# Patient Record
Sex: MALE | Race: WHITE | NOT HISPANIC OR LATINO | Employment: FULL TIME | URBAN - METROPOLITAN AREA
[De-identification: names, ages, dates, MRNs, and addresses within clinical notes are randomized per-mention and may not be internally consistent; named-entity substitution may affect disease eponyms.]

---

## 2022-05-12 PROCEDURE — 87591 N.GONORRHOEAE DNA AMP PROB: CPT | Performed by: EMERGENCY MEDICINE

## 2022-05-12 PROCEDURE — 87491 CHLMYD TRACH DNA AMP PROBE: CPT | Performed by: EMERGENCY MEDICINE

## 2022-09-20 ENCOUNTER — OFFICE VISIT (OUTPATIENT)
Dept: FAMILY MEDICINE CLINIC | Facility: CLINIC | Age: 52
End: 2022-09-20

## 2022-09-20 VITALS
OXYGEN SATURATION: 98 % | BODY MASS INDEX: 40.96 KG/M2 | WEIGHT: 302.4 LBS | HEIGHT: 72 IN | HEART RATE: 68 BPM | SYSTOLIC BLOOD PRESSURE: 144 MMHG | DIASTOLIC BLOOD PRESSURE: 86 MMHG

## 2022-09-20 DIAGNOSIS — R53.83 FATIGUE, UNSPECIFIED TYPE: ICD-10-CM

## 2022-09-20 DIAGNOSIS — Z12.11 COLON CANCER SCREENING: ICD-10-CM

## 2022-09-20 DIAGNOSIS — E04.9 THYROID ENLARGED: ICD-10-CM

## 2022-09-20 DIAGNOSIS — E34.9 TESTOSTERONE DEFICIENCY: ICD-10-CM

## 2022-09-20 DIAGNOSIS — Z01.89 ROUTINE LAB DRAW: Primary | ICD-10-CM

## 2022-09-20 DIAGNOSIS — Z13.29 SCREENING FOR THYROID DISORDER: ICD-10-CM

## 2022-09-20 DIAGNOSIS — Z90.3 H/O GASTRIC SLEEVE: ICD-10-CM

## 2022-09-20 DIAGNOSIS — I10 HYPERTENSION, UNSPECIFIED TYPE: ICD-10-CM

## 2022-09-20 DIAGNOSIS — Z86.39 HISTORY OF THYROID DISORDER: ICD-10-CM

## 2022-09-20 DIAGNOSIS — E55.9 VITAMIN D DEFICIENCY: ICD-10-CM

## 2022-09-20 DIAGNOSIS — E53.8 B12 DEFICIENCY: ICD-10-CM

## 2022-09-20 DIAGNOSIS — Z13.220 SCREENING FOR LIPID DISORDERS: ICD-10-CM

## 2022-09-20 DIAGNOSIS — Z12.5 PROSTATE CANCER SCREENING: ICD-10-CM

## 2022-09-20 PROCEDURE — 99214 OFFICE O/P EST MOD 30 MIN: CPT | Performed by: NURSE PRACTITIONER

## 2022-09-20 NOTE — PROGRESS NOTES
"Chief Complaint  Obesity, fatigue, establish care  SUBJECTIVE  Raúl López presents to [facility]     Pt presents today to establish care. Had gastric sleeve 10 years ago, states would like to get vitamin levels checked, has hx of Vit d and b-12 def and testosterone def. History of hypothyroid as well     States over the last few years has started gaining some weight back .     History of Present Illness  History reviewed. No pertinent past medical history.   History reviewed. No pertinent family history.   Past Surgical History:   Procedure Laterality Date   • ADENOIDECTOMY     • EAR TUBES     • GASTRIC SLEEVE LAPAROSCOPIC     • HERNIA REPAIR     • TONSILLECTOMY          Current Outpatient Medications:   •  fluticasone (FLONASE) 50 MCG/ACT nasal spray, 2 sprays into the nostril(s) as directed by provider Daily., Disp: 9.9 mL, Rfl: 0    OBJECTIVE  Vital Signs:   /86 (BP Location: Left arm)   Pulse 68   Ht 182.9 cm (72\")   Wt (!) 137 kg (302 lb 6.4 oz)   SpO2 98%   BMI 41.01 kg/m²    Estimated body mass index is 41.01 kg/m² as calculated from the following:    Height as of this encounter: 182.9 cm (72\").    Weight as of this encounter: 137 kg (302 lb 6.4 oz).     Wt Readings from Last 3 Encounters:   09/20/22 (!) 137 kg (302 lb 6.4 oz)   08/10/22 (!) 137 kg (302 lb 14.4 oz)   05/12/22 (!) 142 kg (313 lb 12.8 oz)     BP Readings from Last 3 Encounters:   09/20/22 144/86   08/25/22 178/92   08/10/22 137/85       Physical Exam  Vitals reviewed.   Constitutional:       Appearance: Normal appearance. He is well-developed.   HENT:      Head: Normocephalic and atraumatic.      Right Ear: External ear normal.      Left Ear: External ear normal.   Eyes:      Conjunctiva/sclera: Conjunctivae normal.      Pupils: Pupils are equal, round, and reactive to light.   Neck:      Comments: Thyroid feels mildly enlarged on the right  Cardiovascular:      Rate and Rhythm: Normal rate and " regular rhythm.      Heart sounds: No murmur heard.    No friction rub. No gallop.   Pulmonary:      Effort: Pulmonary effort is normal.      Breath sounds: Normal breath sounds. No wheezing or rhonchi.   Skin:     General: Skin is warm and dry.   Neurological:      Mental Status: He is alert and oriented to person, place, and time.      Cranial Nerves: No cranial nerve deficit.   Psychiatric:         Mood and Affect: Mood and affect normal.         Behavior: Behavior normal.         Thought Content: Thought content normal.         Judgment: Judgment normal.          Result Review          No Images in the past 120 days found..     The above data has been reviewed by RAHUL Palencia 09/20/2022 14:25 EDT.          Patient Care Team:  Katie Pang APRN as PCP - General (Nurse Practitioner)    Class 3 Severe Obesity (BMI >=40). Obesity-related health conditions include the following: hypertension. Obesity is worsening. BMI is is above average; BMI management plan is completed. We discussed portion control and increasing exercise.       ASSESSMENT & PLAN    Diagnoses and all orders for this visit:    1. Routine lab draw (Primary)  -     Comprehensive Metabolic Panel; Future  -     CBC & Differential; Future    2. Screening for thyroid disorder  -     TSH; Future    3. Screening for lipid disorders  -     Lipid Panel; Future    4. Fatigue, unspecified type  -     T4, free; Future  -     Testosterone; Future    5. H/O gastric sleeve  -     Vitamin B12; Future  -     Vitamin D 25 hydroxy; Future    6. Colon cancer screening  -     Ambulatory Referral For Screening Colonoscopy; Future    7. Prostate cancer screening  -     PSA SCREENING; Future    8. Thyroid enlarged  -     US Thyroid    9. History of thyroid disorder  -     US Thyroid    10. Hypertension, unspecified type  Assessment & Plan:  Patient reports he has been told several times that his blood pressure is elevated, we are going to go ahead and  initiate low-dose lisinopril today, side effects administration medication discussed, patient will follow-up in 1 month for reevaluation      11. B12 deficiency  -     Vitamin B12; Future    12. Vitamin D deficiency  -     Vitamin D 25 hydroxy; Future    13. Testosterone deficiency  -     Testosterone; Future       Tobacco Use: Low Risk    • Smoking Tobacco Use: Never Smoker   • Smokeless Tobacco Use: Never Used       Follow Up     Return if symptoms worsen or fail to improve.        Patient was given instructions and counseling regarding his condition or for health maintenance advice. Please see specific information pulled into the AVS if appropriate.   I have reviewed information obtained and documented by others and I have confirmed the accuracy of this documented note.    Katie Pang, APRN

## 2022-09-20 NOTE — ASSESSMENT & PLAN NOTE
Patient reports he has been told several times that his blood pressure is elevated, we are going to go ahead and initiate low-dose lisinopril today, side effects administration medication discussed, patient will follow-up in 1 month for reevaluation

## 2022-09-21 ENCOUNTER — LAB (OUTPATIENT)
Dept: LAB | Facility: HOSPITAL | Age: 52
End: 2022-09-21

## 2022-09-21 DIAGNOSIS — E53.8 B12 DEFICIENCY: ICD-10-CM

## 2022-09-21 DIAGNOSIS — Z01.89 ROUTINE LAB DRAW: ICD-10-CM

## 2022-09-21 DIAGNOSIS — Z13.220 SCREENING FOR LIPID DISORDERS: ICD-10-CM

## 2022-09-21 DIAGNOSIS — Z12.5 PROSTATE CANCER SCREENING: ICD-10-CM

## 2022-09-21 DIAGNOSIS — E55.9 VITAMIN D DEFICIENCY: ICD-10-CM

## 2022-09-21 DIAGNOSIS — Z13.29 SCREENING FOR THYROID DISORDER: ICD-10-CM

## 2022-09-21 DIAGNOSIS — R53.83 FATIGUE, UNSPECIFIED TYPE: ICD-10-CM

## 2022-09-21 DIAGNOSIS — E34.9 TESTOSTERONE DEFICIENCY: ICD-10-CM

## 2022-09-21 DIAGNOSIS — Z90.3 H/O GASTRIC SLEEVE: ICD-10-CM

## 2022-09-21 LAB
25(OH)D3 SERPL-MCNC: 26 NG/ML (ref 30–100)
ALBUMIN SERPL-MCNC: 4.3 G/DL (ref 3.5–5.2)
ALBUMIN/GLOB SERPL: 1.7 G/DL
ALP SERPL-CCNC: 64 U/L (ref 39–117)
ALT SERPL W P-5'-P-CCNC: 10 U/L (ref 1–41)
ANION GAP SERPL CALCULATED.3IONS-SCNC: 9 MMOL/L (ref 5–15)
AST SERPL-CCNC: 17 U/L (ref 1–40)
BASOPHILS # BLD AUTO: 0.05 10*3/MM3 (ref 0–0.2)
BASOPHILS NFR BLD AUTO: 0.8 % (ref 0–1.5)
BILIRUB SERPL-MCNC: 0.8 MG/DL (ref 0–1.2)
BUN SERPL-MCNC: 15 MG/DL (ref 6–20)
BUN/CREAT SERPL: 14.6 (ref 7–25)
CALCIUM SPEC-SCNC: 9.2 MG/DL (ref 8.6–10.5)
CHLORIDE SERPL-SCNC: 104 MMOL/L (ref 98–107)
CHOLEST SERPL-MCNC: 158 MG/DL (ref 0–200)
CO2 SERPL-SCNC: 26 MMOL/L (ref 22–29)
CREAT SERPL-MCNC: 1.03 MG/DL (ref 0.76–1.27)
DEPRECATED RDW RBC AUTO: 40.6 FL (ref 37–54)
EGFRCR SERPLBLD CKD-EPI 2021: 88.5 ML/MIN/1.73
EOSINOPHIL # BLD AUTO: 0.08 10*3/MM3 (ref 0–0.4)
EOSINOPHIL NFR BLD AUTO: 1.2 % (ref 0.3–6.2)
ERYTHROCYTE [DISTWIDTH] IN BLOOD BY AUTOMATED COUNT: 13.1 % (ref 12.3–15.4)
GLOBULIN UR ELPH-MCNC: 2.6 GM/DL
GLUCOSE SERPL-MCNC: 89 MG/DL (ref 65–99)
HCT VFR BLD AUTO: 43.8 % (ref 37.5–51)
HDLC SERPL-MCNC: 37 MG/DL (ref 40–60)
HGB BLD-MCNC: 14.6 G/DL (ref 13–17.7)
IMM GRANULOCYTES # BLD AUTO: 0.04 10*3/MM3 (ref 0–0.05)
IMM GRANULOCYTES NFR BLD AUTO: 0.6 % (ref 0–0.5)
LDLC SERPL CALC-MCNC: 107 MG/DL (ref 0–100)
LDLC/HDLC SERPL: 2.9 {RATIO}
LYMPHOCYTES # BLD AUTO: 2.58 10*3/MM3 (ref 0.7–3.1)
LYMPHOCYTES NFR BLD AUTO: 39.5 % (ref 19.6–45.3)
MCH RBC QN AUTO: 28.9 PG (ref 26.6–33)
MCHC RBC AUTO-ENTMCNC: 33.3 G/DL (ref 31.5–35.7)
MCV RBC AUTO: 86.6 FL (ref 79–97)
MONOCYTES # BLD AUTO: 0.49 10*3/MM3 (ref 0.1–0.9)
MONOCYTES NFR BLD AUTO: 7.5 % (ref 5–12)
NEUTROPHILS NFR BLD AUTO: 3.29 10*3/MM3 (ref 1.7–7)
NEUTROPHILS NFR BLD AUTO: 50.4 % (ref 42.7–76)
NRBC BLD AUTO-RTO: 0 /100 WBC (ref 0–0.2)
PLATELET # BLD AUTO: 230 10*3/MM3 (ref 140–450)
PMV BLD AUTO: 10.7 FL (ref 6–12)
POTASSIUM SERPL-SCNC: 4.1 MMOL/L (ref 3.5–5.2)
PROT SERPL-MCNC: 6.9 G/DL (ref 6–8.5)
PSA SERPL-MCNC: 0.96 NG/ML (ref 0–4)
RBC # BLD AUTO: 5.06 10*6/MM3 (ref 4.14–5.8)
SODIUM SERPL-SCNC: 139 MMOL/L (ref 136–145)
T4 FREE SERPL-MCNC: 0.88 NG/DL (ref 0.93–1.7)
TESTOST SERPL-MCNC: 340 NG/DL (ref 193–740)
TRIGL SERPL-MCNC: 69 MG/DL (ref 0–150)
TSH SERPL DL<=0.05 MIU/L-ACNC: 4.03 UIU/ML (ref 0.27–4.2)
VIT B12 BLD-MCNC: 213 PG/ML (ref 211–946)
VLDLC SERPL-MCNC: 14 MG/DL (ref 5–40)
WBC NRBC COR # BLD: 6.53 10*3/MM3 (ref 3.4–10.8)

## 2022-09-21 PROCEDURE — 84439 ASSAY OF FREE THYROXINE: CPT

## 2022-09-21 PROCEDURE — 82607 VITAMIN B-12: CPT

## 2022-09-21 PROCEDURE — 80061 LIPID PANEL: CPT

## 2022-09-21 PROCEDURE — 82306 VITAMIN D 25 HYDROXY: CPT

## 2022-09-21 PROCEDURE — 80050 GENERAL HEALTH PANEL: CPT

## 2022-09-21 PROCEDURE — 36415 COLL VENOUS BLD VENIPUNCTURE: CPT

## 2022-09-21 PROCEDURE — 84403 ASSAY OF TOTAL TESTOSTERONE: CPT

## 2022-09-21 PROCEDURE — G0103 PSA SCREENING: HCPCS

## 2022-09-23 ENCOUNTER — TELEPHONE (OUTPATIENT)
Dept: FAMILY MEDICINE CLINIC | Facility: CLINIC | Age: 52
End: 2022-09-23

## 2022-09-23 ENCOUNTER — PATIENT ROUNDING (BHMG ONLY) (OUTPATIENT)
Dept: FAMILY MEDICINE CLINIC | Facility: CLINIC | Age: 52
End: 2022-09-23

## 2022-09-23 NOTE — PROGRESS NOTES
A Yolto message has been sent to the patient for PATIENT ROUNDING with [facility].

## 2022-09-23 NOTE — TELEPHONE ENCOUNTER
Caller: Raúl López    Relationship: Self    Best call back number: [phone]    What medication are you requesting:LOW DOSE LISINOPRIL    What are your current symptoms: N/A    How long have you been experiencing symptoms: N/A    Have you had these symptoms before:    [x] Yes  [] No    Have you been treated for these symptoms before:   [x] Yes  [] No    If a prescription is needed, what is your preferred pharmacy and phone number: [facility] - [address] - [phone]  - [phone] FX     Additional notes:PATIENT WAS SEEN BY [facility] ON 09/20/2022 AND WAS DETERMINED TO PLACE HIM ON LOW DOSE LISINOPRIL FOR HIS HIGH BLOOD PRESSURE FROM HIS APPOINTMENT NOTE. HE CHECKED WITH THE PHARMACY AND THIS HAD BEEN CALLED IN. PLEASE SUBMIT TO PHARMACY TODAY ASAP.

## 2022-09-26 DIAGNOSIS — E55.9 VITAMIN D DEFICIENCY: Primary | ICD-10-CM

## 2022-09-26 DIAGNOSIS — E04.9 THYROID ENLARGED: ICD-10-CM

## 2022-09-26 DIAGNOSIS — E53.8 B12 DEFICIENCY: ICD-10-CM

## 2022-09-26 RX ORDER — CYANOCOBALAMIN 1000 UG/ML
INJECTION, SOLUTION INTRAMUSCULAR; SUBCUTANEOUS
Qty: 1 ML | Refills: 12 | Status: SHIPPED | OUTPATIENT
Start: 2022-09-26

## 2022-09-26 RX ORDER — LISINOPRIL 10 MG/1
10 TABLET ORAL DAILY
Qty: 30 TABLET | Refills: 1 | Status: SHIPPED | OUTPATIENT
Start: 2022-09-26 | End: 2022-10-20 | Stop reason: SDUPTHER

## 2022-09-26 RX ORDER — ERGOCALCIFEROL 1.25 MG/1
50000 CAPSULE ORAL WEEKLY
Qty: 12 CAPSULE | Refills: 1 | Status: SHIPPED | OUTPATIENT
Start: 2022-09-26 | End: 2023-01-16 | Stop reason: SDUPTHER

## 2022-09-26 NOTE — TELEPHONE ENCOUNTER
Please inform patient that I am not sure why that medication did not go through the first time, I have sent it in today, I do apologize for the delay, I was off for vacation at the end of last week and just returned to office today.

## 2022-09-28 ENCOUNTER — HOSPITAL ENCOUNTER (OUTPATIENT)
Dept: ULTRASOUND IMAGING | Facility: HOSPITAL | Age: 52
Discharge: HOME | End: 2022-09-28
Admitting: NURSE PRACTITIONER

## 2022-09-28 PROCEDURE — 76536 US EXAM OF HEAD AND NECK: CPT

## 2022-10-20 ENCOUNTER — OFFICE VISIT (OUTPATIENT)
Dept: FAMILY MEDICINE CLINIC | Facility: CLINIC | Age: 52
End: 2022-10-20

## 2022-10-20 VITALS
HEART RATE: 64 BPM | SYSTOLIC BLOOD PRESSURE: 132 MMHG | WEIGHT: 300.6 LBS | BODY MASS INDEX: 40.71 KG/M2 | HEIGHT: 72 IN | DIASTOLIC BLOOD PRESSURE: 84 MMHG | OXYGEN SATURATION: 98 %

## 2022-10-20 DIAGNOSIS — Z00.00 ANNUAL PHYSICAL EXAM: Primary | ICD-10-CM

## 2022-10-20 DIAGNOSIS — I10 HYPERTENSION, UNSPECIFIED TYPE: ICD-10-CM

## 2022-10-20 PROCEDURE — 99396 PREV VISIT EST AGE 40-64: CPT | Performed by: NURSE PRACTITIONER

## 2022-10-20 RX ORDER — LISINOPRIL 20 MG/1
20 TABLET ORAL DAILY
Qty: 90 TABLET | Refills: 1 | Status: SHIPPED | OUTPATIENT
Start: 2022-10-20 | End: 2023-01-16 | Stop reason: SDUPTHER

## 2022-10-20 NOTE — ASSESSMENT & PLAN NOTE
Hypertension had improved however it is still not at goal, we are going to go ahead and increase lisinopril to 20 mg daily, patient will follow-up in 3 months, continue to monitor blood pressure at home.

## 2022-10-20 NOTE — PROGRESS NOTES
"Chief Complaint  Annual Exam,    SUBJECTIVE  Raúl CORREA Stone presents to [facility]   Patient presents today for annual physical exam, also needs to follow-up on his hypertension.  Would like to discuss recent lab results.      History of Present Illness  History reviewed. No pertinent past medical history.   History reviewed. No pertinent family history.   Past Surgical History:   Procedure Laterality Date   • ADENOIDECTOMY     • EAR TUBES     • GASTRIC SLEEVE LAPAROSCOPIC     • HERNIA REPAIR     • TONSILLECTOMY          Current Outpatient Medications:   •  cyanocobalamin 1000 MCG/ML injection, Injection weekly for 4 weeks then monthly thereafter, Disp: 1 mL, Rfl: 12  •  lisinopril (PRINIVIL,ZESTRIL) 20 MG tablet, Take 1 tablet by mouth Daily., Disp: 90 tablet, Rfl: 1  •  vitamin D (ERGOCALCIFEROL) 1.25 MG (58424 UT) capsule capsule, Take 1 capsule by mouth 1 (One) Time Per Week., Disp: 12 capsule, Rfl: 1  •  fluticasone (FLONASE) 50 MCG/ACT nasal spray, 2 sprays into the nostril(s) as directed by provider Daily., Disp: 9.9 mL, Rfl: 0    OBJECTIVE  Vital Signs:   /84   Pulse 64   Ht 182.9 cm (72\")   Wt (!) 136 kg (300 lb 9.6 oz)   SpO2 98%   BMI 40.77 kg/m²    Estimated body mass index is 40.77 kg/m² as calculated from the following:    Height as of this encounter: 182.9 cm (72\").    Weight as of this encounter: 136 kg (300 lb 9.6 oz).     Wt Readings from Last 3 Encounters:   10/20/22 (!) 136 kg (300 lb 9.6 oz)   09/20/22 (!) 137 kg (302 lb 6.4 oz)   08/10/22 (!) 137 kg (302 lb 14.4 oz)     BP Readings from Last 3 Encounters:   10/20/22 132/84   09/20/22 144/86   08/25/22 178/92       Physical Exam  Vitals reviewed.   Constitutional:       General: He is not in acute distress.     Appearance: Normal appearance. He is well-developed. He is obese. He is not diaphoretic.   HENT:      Head: Normocephalic and atraumatic. Hair is normal.      Right Ear: Hearing, tympanic " membrane, ear canal and external ear normal.      Left Ear: Hearing, tympanic membrane, ear canal and external ear normal.      Nose: Nose normal. No nasal deformity.      Mouth/Throat:      Mouth: Mucous membranes are moist. No oral lesions.      Pharynx: Uvula midline. No oropharyngeal exudate or uvula swelling.   Eyes:      General: Lids are normal. No scleral icterus.        Right eye: No discharge.         Left eye: No discharge.      Extraocular Movements: Extraocular movements intact.      Right eye: Normal extraocular motion and no nystagmus.      Left eye: Normal extraocular motion and no nystagmus.      Conjunctiva/sclera: Conjunctivae normal.      Pupils: Pupils are equal, round, and reactive to light.   Neck:      Thyroid: No thyromegaly.      Vascular: No JVD.   Cardiovascular:      Rate and Rhythm: Normal rate and regular rhythm.      Pulses: Normal pulses.      Heart sounds: Normal heart sounds. No murmur heard.    No friction rub. No gallop.   Pulmonary:      Effort: Pulmonary effort is normal. No respiratory distress.      Breath sounds: Normal breath sounds. No wheezing, rhonchi or rales.   Chest:      Chest wall: No tenderness.   Abdominal:      General: Bowel sounds are normal. There is no distension.      Palpations: Abdomen is soft. There is no mass.      Tenderness: There is no abdominal tenderness. There is no guarding.      Hernia: No hernia is present.   Musculoskeletal:         General: No tenderness or deformity. Normal range of motion.      Cervical back: Normal range of motion and neck supple.   Lymphadenopathy:      Cervical: No cervical adenopathy.   Skin:     General: Skin is warm and dry.      Findings: No rash.   Neurological:      Mental Status: He is alert and oriented to person, place, and time.      Cranial Nerves: No cranial nerve deficit.      Coordination: Coordination normal.      Deep Tendon Reflexes: Reflexes are normal and symmetric. Reflexes normal.   Psychiatric:          Mood and Affect: Mood and affect normal.         Behavior: Behavior normal.         Thought Content: Thought content normal.         Judgment: Judgment normal.          Result Review    CMP    CMP 9/21/22   Glucose 89   BUN 15   Creatinine 1.03   Sodium 139   Potassium 4.1   Chloride 104   Calcium 9.2   Albumin 4.30   Total Bilirubin 0.8   Alkaline Phosphatase 64   AST (SGOT) 17   ALT (SGPT) 10           CBC    CBC 9/21/22   WBC 6.53   RBC 5.06   Hemoglobin 14.6   Hematocrit 43.8   MCV 86.6   MCH 28.9   MCHC 33.3   RDW 13.1   Platelets 230           Lipid Panel    Lipid Panel 9/21/22   Total Cholesterol 158   Triglycerides 69   HDL Cholesterol 37 (A)   VLDL Cholesterol 14   LDL Cholesterol  107 (A)   LDL/HDL Ratio 2.90   (A) Abnormal value            TSH    TSH 9/21/22   TSH 4.030           Lab Results   Component Value Date    [id number] 213 09/21/2022       US Thyroid    Result Date: 9/28/2022    1. Unremarkable thyroid gland.     STEPHEN ZHOU MD       Electronically Signed and Approved By: STEPHEN ZHOU MD on 9/28/2022 at 16:28                The above data has been reviewed by RAHUL Palencia 10/20/2022 12:02 EDT.          Patient Care Team:  Katie Pang APRN as PCP - General (Nurse Practitioner)  Cherry Cortes MA as Medical Assistant    Class 3 Severe Obesity (BMI >=40). Obesity-related health conditions include the following: hypertension. Obesity is improving with lifestyle modifications. BMI is is above average; BMI management plan is completed. We discussed portion control and increasing exercise.  Patient has lost 2 pounds since last office visit      ASSESSMENT & PLAN    Diagnoses and all orders for this visit:    1. Annual physical exam (Primary)  Comments:  Labs drawn prior to exam and reviewed in office visit today    2. Hypertension, unspecified type  Assessment & Plan:  Hypertension had improved however it is still not at goal, we are going to go ahead and increase  lisinopril to 20 mg daily, patient will follow-up in 3 months, continue to monitor blood pressure at home.    Orders:  -     lisinopril (PRINIVIL,ZESTRIL) 20 MG tablet; Take 1 tablet by mouth Daily.  Dispense: 90 tablet; Refill: 1       Tobacco Use: Low Risk    • Smoking Tobacco Use: Never   • Smokeless Tobacco Use: Never   • Passive Exposure: Not on file       Follow Up     Return in about 3 months (around 1/20/2023).        Patient was given instructions and counseling regarding his condition or for health maintenance advice. Please see specific information pulled into the AVS if appropriate.   I have reviewed information obtained and documented by others and I have confirmed the accuracy of this documented note.    RAHUL Palencia

## 2022-11-14 ENCOUNTER — TELEPHONE (OUTPATIENT)
Dept: FAMILY MEDICINE CLINIC | Facility: CLINIC | Age: 52
End: 2022-11-14

## 2022-11-28 ENCOUNTER — TELEPHONE (OUTPATIENT)
Dept: FAMILY MEDICINE CLINIC | Facility: CLINIC | Age: 52
End: 2022-11-28

## 2022-12-09 ENCOUNTER — TELEPHONE (OUTPATIENT)
Dept: FAMILY MEDICINE CLINIC | Facility: CLINIC | Age: 52
End: 2022-12-09

## 2022-12-09 ENCOUNTER — TELEPHONE (OUTPATIENT)
Dept: SURGERY | Facility: CLINIC | Age: 52
End: 2022-12-09

## 2022-12-09 NOTE — TELEPHONE ENCOUNTER
SPOKE TO MOHINDER AT [facility]'S OFFICE TO INFORM PT CX APPT WITH Sanjuana AKERS FROM 12/8/MS

## 2022-12-09 NOTE — TELEPHONE ENCOUNTER
Caller: ISRAEL    Relationship: Provider Sanjuana AKERS    Best call back number: [phone]    What is the best time to reach you: ANYTIME BEFORE 5:00    Who are you requesting to speak with (clinical staff, provider,  specific staff member): SONAM /MA    Do you know the name of the person who called: ISRAEL    What was the call regarding: PATIENT DID NOT GO TO HIS APPT THAT WAS SCHEDULE    Do you require a callback: [phone]

## 2023-01-04 ENCOUNTER — LAB (OUTPATIENT)
Dept: LAB | Facility: HOSPITAL | Age: 53
End: 2023-01-04
Payer: COMMERCIAL

## 2023-01-04 DIAGNOSIS — E04.9 THYROID ENLARGED: ICD-10-CM

## 2023-01-04 LAB
T4 FREE SERPL-MCNC: 0.9 NG/DL (ref 0.93–1.7)
TSH SERPL DL<=0.05 MIU/L-ACNC: 2.32 UIU/ML (ref 0.27–4.2)

## 2023-01-04 PROCEDURE — 84443 ASSAY THYROID STIM HORMONE: CPT

## 2023-01-04 PROCEDURE — 84439 ASSAY OF FREE THYROXINE: CPT

## 2023-01-04 PROCEDURE — 36415 COLL VENOUS BLD VENIPUNCTURE: CPT

## 2023-01-05 DIAGNOSIS — R94.6 ABNORMAL THYROID FUNCTION TEST: ICD-10-CM

## 2023-01-05 DIAGNOSIS — R79.89 ELEVATED SERUM FREE T4 LEVEL: Primary | ICD-10-CM

## 2023-01-16 ENCOUNTER — OFFICE VISIT (OUTPATIENT)
Dept: FAMILY MEDICINE CLINIC | Facility: CLINIC | Age: 53
End: 2023-01-16
Payer: COMMERCIAL

## 2023-01-16 VITALS
BODY MASS INDEX: 40.63 KG/M2 | HEIGHT: 72 IN | HEART RATE: 70 BPM | SYSTOLIC BLOOD PRESSURE: 111 MMHG | WEIGHT: 300 LBS | OXYGEN SATURATION: 97 % | DIASTOLIC BLOOD PRESSURE: 58 MMHG

## 2023-01-16 DIAGNOSIS — E55.9 VITAMIN D DEFICIENCY: ICD-10-CM

## 2023-01-16 DIAGNOSIS — Z53.20 COLON CANCER SCREENING DECLINED: ICD-10-CM

## 2023-01-16 DIAGNOSIS — I10 HYPERTENSION, UNSPECIFIED TYPE: Primary | ICD-10-CM

## 2023-01-16 PROCEDURE — 99214 OFFICE O/P EST MOD 30 MIN: CPT | Performed by: NURSE PRACTITIONER

## 2023-01-16 RX ORDER — LISINOPRIL 20 MG/1
20 TABLET ORAL DAILY
Qty: 90 TABLET | Refills: 1 | Status: SHIPPED | OUTPATIENT
Start: 2023-01-16

## 2023-01-16 RX ORDER — ERGOCALCIFEROL 1.25 MG/1
50000 CAPSULE ORAL WEEKLY
Qty: 12 CAPSULE | Refills: 1 | Status: SHIPPED | OUTPATIENT
Start: 2023-01-16

## 2023-01-16 NOTE — ASSESSMENT & PLAN NOTE
Lisinopril increased to 20 mg at last office visit, blood pressure much better controlled today, continue current dose

## 2023-01-16 NOTE — PROGRESS NOTES
"Chief Complaint  Hypertension    SUBJECTIVE  Raúl López presents to [facility]     Pt here today to follow up on HTN. Pt does not check him BP at home. Today in office is 111/58.    Pt had to cancel consult appt for colon screen pt declines referral at this time as he wants to get set up with Endo for thyroid first.     History of Present Illness  History reviewed. No pertinent past medical history.   History reviewed. No pertinent family history.   Past Surgical History:   Procedure Laterality Date   • ADENOIDECTOMY     • EAR TUBES     • GASTRIC SLEEVE LAPAROSCOPIC     • HERNIA REPAIR     • TONSILLECTOMY          Current Outpatient Medications:   •  cyanocobalamin 1000 MCG/ML injection, Injection weekly for 4 weeks then monthly thereafter, Disp: 1 mL, Rfl: 12  •  lisinopril (PRINIVIL,ZESTRIL) 20 MG tablet, Take 1 tablet by mouth Daily., Disp: 90 tablet, Rfl: 1  •  vitamin D (ERGOCALCIFEROL) 1.25 MG (87921 UT) capsule capsule, Take 1 capsule by mouth 1 (One) Time Per Week., Disp: 12 capsule, Rfl: 1  •  fluticasone (FLONASE) 50 MCG/ACT nasal spray, 2 sprays into the nostril(s) as directed by provider Daily., Disp: 9.9 mL, Rfl: 0    OBJECTIVE  Vital Signs:   /58   Pulse 70   Ht 182.9 cm (72\")   Wt 136 kg (300 lb)   SpO2 97%   BMI 40.69 kg/m²    Estimated body mass index is 40.69 kg/m² as calculated from the following:    Height as of this encounter: 182.9 cm (72\").    Weight as of this encounter: 136 kg (300 lb).     Wt Readings from Last 3 Encounters:   01/16/23 136 kg (300 lb)   10/20/22 (!) 136 kg (300 lb 9.6 oz)   09/20/22 (!) 137 kg (302 lb 6.4 oz)     BP Readings from Last 3 Encounters:   01/16/23 111/58   10/20/22 132/84   09/20/22 144/86       Physical Exam  Vitals reviewed.   Constitutional:       Appearance: Normal appearance. He is well-developed.   HENT:      Head: Normocephalic and atraumatic.      Right Ear: External ear normal.      Left Ear: External ear " normal.   Eyes:      Conjunctiva/sclera: Conjunctivae normal.      Pupils: Pupils are equal, round, and reactive to light.   Cardiovascular:      Rate and Rhythm: Normal rate and regular rhythm.      Heart sounds: No murmur heard.    No friction rub. No gallop.   Pulmonary:      Effort: Pulmonary effort is normal.      Breath sounds: Normal breath sounds. No wheezing or rhonchi.   Skin:     General: Skin is warm and dry.   Neurological:      Mental Status: He is alert and oriented to person, place, and time.      Cranial Nerves: No cranial nerve deficit.   Psychiatric:         Mood and Affect: Mood and affect normal.         Behavior: Behavior normal.         Thought Content: Thought content normal.         Judgment: Judgment normal.          Result Review    CMP    CMP 9/21/22   Glucose 89   BUN 15   Creatinine 1.03   eGFR 88.5   Sodium 139   Potassium 4.1   Chloride 104   Calcium 9.2   Total Protein 6.9   Albumin 4.30   Globulin 2.6   Total Bilirubin 0.8   Alkaline Phosphatase 64   AST (SGOT) 17   ALT (SGPT) 10   Albumin/Globulin Ratio 1.7   BUN/Creatinine Ratio 14.6   Anion Gap 9.0      Comments are available for some flowsheets but are not being displayed.           CBC    CBC 9/21/22   WBC 6.53   RBC 5.06   Hemoglobin 14.6   Hematocrit 43.8   MCV 86.6   MCH 28.9   MCHC 33.3   RDW 13.1   Platelets 230           Lipid Panel    Lipid Panel 9/21/22   Total Cholesterol 158   Triglycerides 69   HDL Cholesterol 37 (A)   VLDL Cholesterol 14   LDL Cholesterol  107 (A)   LDL/HDL Ratio 2.90   (A) Abnormal value            TSH    TSH 9/21/22 1/4/23   TSH 4.030 2.320             US Thyroid    Result Date: 9/28/2022    1. Unremarkable thyroid gland.     STEPHEN ZHOU MD       Electronically Signed and Approved By: STEPHEN ZHOU MD on 9/28/2022 at 16:28                The above data has been reviewed by RAHUL Palencia 01/16/2023 11:16 EST.          Patient Care Team:  Katie Pang APRN as PCP - General  (Nurse Practitioner)  Cherry Cortes MA as Medical Assistant    Class 3 Severe Obesity (BMI >=40). Obesity-related health conditions include the following: hypertension. Obesity is unchanged. BMI is is above average; BMI management plan is completed. We discussed portion control and increasing exercise.       ASSESSMENT & PLAN    Diagnoses and all orders for this visit:    1. Hypertension, unspecified type (Primary)  Assessment & Plan:  Lisinopril increased to 20 mg at last office visit, blood pressure much better controlled today, continue current dose    Orders:  -     lisinopril (PRINIVIL,ZESTRIL) 20 MG tablet; Take 1 tablet by mouth Daily.  Dispense: 90 tablet; Refill: 1    2. Vitamin D deficiency  Assessment & Plan:  Stable with vitamin D replacement, continue current dose    Orders:  -     vitamin D (ERGOCALCIFEROL) 1.25 MG (14924 UT) capsule capsule; Take 1 capsule by mouth 1 (One) Time Per Week.  Dispense: 12 capsule; Refill: 1    3. Colon cancer screening declined       Tobacco Use: Low Risk    • Smoking Tobacco Use: Never   • Smokeless Tobacco Use: Never   • Passive Exposure: Not on file       Follow Up     Return in about 6 months (around 7/16/2023), or if symptoms worsen or fail to improve.        Patient was given instructions and counseling regarding his condition or for health maintenance advice. Please see specific information pulled into the AVS if appropriate.   I have reviewed information obtained and documented by others and I have confirmed the accuracy of this documented note.    RAHUL Palencia

## 2023-08-23 ENCOUNTER — OFFICE VISIT (OUTPATIENT)
Dept: FAMILY MEDICINE CLINIC | Facility: CLINIC | Age: 53
End: 2023-08-23
Payer: COMMERCIAL

## 2023-08-23 ENCOUNTER — HOSPITAL ENCOUNTER (OUTPATIENT)
Dept: GENERAL RADIOLOGY | Facility: HOSPITAL | Age: 53
Discharge: HOME | End: 2023-08-23
Payer: COMMERCIAL

## 2023-08-23 ENCOUNTER — LAB (OUTPATIENT)
Dept: LAB | Facility: HOSPITAL | Age: 53
End: 2023-08-23
Payer: COMMERCIAL

## 2023-08-23 VITALS
BODY MASS INDEX: 41.85 KG/M2 | HEART RATE: 68 BPM | WEIGHT: 309 LBS | DIASTOLIC BLOOD PRESSURE: 79 MMHG | OXYGEN SATURATION: 97 % | HEIGHT: 72 IN | SYSTOLIC BLOOD PRESSURE: 132 MMHG

## 2023-08-23 DIAGNOSIS — I10 HYPERTENSION, UNSPECIFIED TYPE: ICD-10-CM

## 2023-08-23 DIAGNOSIS — Z13.29 SCREENING FOR THYROID DISORDER: ICD-10-CM

## 2023-08-23 DIAGNOSIS — Z13.220 LIPID SCREENING: ICD-10-CM

## 2023-08-23 DIAGNOSIS — E55.9 VITAMIN D DEFICIENCY: ICD-10-CM

## 2023-08-23 DIAGNOSIS — Z12.11 COLON CANCER SCREENING: ICD-10-CM

## 2023-08-23 DIAGNOSIS — E53.8 B12 DEFICIENCY: ICD-10-CM

## 2023-08-23 DIAGNOSIS — N48.89 NODULE OF SHAFT OF PENIS: ICD-10-CM

## 2023-08-23 DIAGNOSIS — I10 HYPERTENSION, UNSPECIFIED TYPE: Primary | ICD-10-CM

## 2023-08-23 LAB
25(OH)D3 SERPL-MCNC: 26.6 NG/ML (ref 30–100)
ALBUMIN SERPL-MCNC: 4.3 G/DL (ref 3.5–5.2)
ALBUMIN/GLOB SERPL: 1.5 G/DL
ALP SERPL-CCNC: 73 U/L (ref 39–117)
ALT SERPL W P-5'-P-CCNC: 17 U/L (ref 1–41)
ANION GAP SERPL CALCULATED.3IONS-SCNC: 11 MMOL/L (ref 5–15)
AST SERPL-CCNC: 15 U/L (ref 1–40)
BASOPHILS # BLD AUTO: 0.05 10*3/MM3 (ref 0–0.2)
BASOPHILS NFR BLD AUTO: 0.8 % (ref 0–1.5)
BILIRUB SERPL-MCNC: 0.8 MG/DL (ref 0–1.2)
BUN SERPL-MCNC: 17 MG/DL (ref 6–20)
BUN/CREAT SERPL: 16.8 (ref 7–25)
CALCIUM SPEC-SCNC: 9.2 MG/DL (ref 8.6–10.5)
CHLORIDE SERPL-SCNC: 104 MMOL/L (ref 98–107)
CHOLEST SERPL-MCNC: 176 MG/DL (ref 0–200)
CO2 SERPL-SCNC: 26 MMOL/L (ref 22–29)
CREAT SERPL-MCNC: 1.01 MG/DL (ref 0.76–1.27)
DEPRECATED RDW RBC AUTO: 40.2 FL (ref 37–54)
EGFRCR SERPLBLD CKD-EPI 2021: 90 ML/MIN/1.73
EOSINOPHIL # BLD AUTO: 0.09 10*3/MM3 (ref 0–0.4)
EOSINOPHIL NFR BLD AUTO: 1.4 % (ref 0.3–6.2)
ERYTHROCYTE [DISTWIDTH] IN BLOOD BY AUTOMATED COUNT: 13 % (ref 12.3–15.4)
GLOBULIN UR ELPH-MCNC: 2.9 GM/DL
GLUCOSE SERPL-MCNC: 99 MG/DL (ref 65–99)
HCT VFR BLD AUTO: 45.5 % (ref 37.5–51)
HDLC SERPL-MCNC: 40 MG/DL (ref 40–60)
HGB BLD-MCNC: 15.4 G/DL (ref 13–17.7)
IMM GRANULOCYTES # BLD AUTO: 0.05 10*3/MM3 (ref 0–0.05)
IMM GRANULOCYTES NFR BLD AUTO: 0.8 % (ref 0–0.5)
LDLC SERPL CALC-MCNC: 121 MG/DL (ref 0–100)
LDLC/HDLC SERPL: 3.01 {RATIO}
LYMPHOCYTES # BLD AUTO: 2.4 10*3/MM3 (ref 0.7–3.1)
LYMPHOCYTES NFR BLD AUTO: 36.9 % (ref 19.6–45.3)
MCH RBC QN AUTO: 28.8 PG (ref 26.6–33)
MCHC RBC AUTO-ENTMCNC: 33.8 G/DL (ref 31.5–35.7)
MCV RBC AUTO: 85.2 FL (ref 79–97)
MONOCYTES # BLD AUTO: 0.51 10*3/MM3 (ref 0.1–0.9)
MONOCYTES NFR BLD AUTO: 7.8 % (ref 5–12)
NEUTROPHILS NFR BLD AUTO: 3.41 10*3/MM3 (ref 1.7–7)
NEUTROPHILS NFR BLD AUTO: 52.3 % (ref 42.7–76)
NRBC BLD AUTO-RTO: 0 /100 WBC (ref 0–0.2)
PLATELET # BLD AUTO: 229 10*3/MM3 (ref 140–450)
PMV BLD AUTO: 10.7 FL (ref 6–12)
POTASSIUM SERPL-SCNC: 4.3 MMOL/L (ref 3.5–5.2)
PROT SERPL-MCNC: 7.2 G/DL (ref 6–8.5)
RBC # BLD AUTO: 5.34 10*6/MM3 (ref 4.14–5.8)
SODIUM SERPL-SCNC: 141 MMOL/L (ref 136–145)
TRIGL SERPL-MCNC: 79 MG/DL (ref 0–150)
TSH SERPL DL<=0.05 MIU/L-ACNC: 2.55 UIU/ML (ref 0.27–4.2)
VIT B12 BLD-MCNC: 394 PG/ML (ref 211–946)
VLDLC SERPL-MCNC: 15 MG/DL (ref 5–40)
WBC NRBC COR # BLD: 6.51 10*3/MM3 (ref 3.4–10.8)

## 2023-08-23 PROCEDURE — 82306 VITAMIN D 25 HYDROXY: CPT

## 2023-08-23 PROCEDURE — 82607 VITAMIN B-12: CPT

## 2023-08-23 PROCEDURE — 80050 GENERAL HEALTH PANEL: CPT

## 2023-08-23 PROCEDURE — 36415 COLL VENOUS BLD VENIPUNCTURE: CPT

## 2023-08-23 PROCEDURE — 74018 RADEX ABDOMEN 1 VIEW: CPT

## 2023-08-23 PROCEDURE — 80061 LIPID PANEL: CPT

## 2023-08-23 PROCEDURE — 99214 OFFICE O/P EST MOD 30 MIN: CPT | Performed by: NURSE PRACTITIONER

## 2023-08-23 RX ORDER — LISINOPRIL 20 MG/1
20 TABLET ORAL DAILY
Qty: 90 TABLET | Refills: 1 | Status: SHIPPED | OUTPATIENT
Start: 2023-08-23

## 2023-08-23 RX ORDER — ERGOCALCIFEROL 1.25 MG/1
50000 CAPSULE ORAL WEEKLY
Qty: 12 CAPSULE | Refills: 1 | Status: SHIPPED | OUTPATIENT
Start: 2023-08-23

## 2023-08-23 RX ORDER — CYANOCOBALAMIN 1000 UG/ML
1000 INJECTION, SOLUTION INTRAMUSCULAR; SUBCUTANEOUS
Qty: 3 ML | Refills: 2 | Status: SHIPPED | OUTPATIENT
Start: 2023-08-23

## 2023-08-23 NOTE — ASSESSMENT & PLAN NOTE
Discussed with patient that I feel like it looks most consistent with a possible epidermoid cyst, will obtain x-ray to rule out foreign body, ultrasound for further eval if needed

## 2023-08-23 NOTE — PROGRESS NOTES
"Chief Complaint  Hypertension and Vitamin D Deficiency    SUBJECTIVE  Raúl López presents to [facility] 6 month follow up pretension and vitamin D deficiency.    Pt is requesting to get a 90 day supply of his B12 injections instead of monthly.     Pt is interested in Cologuard.     Patient would also like to discuss a nodule on the shaft of his penis.  Patient states he previously had a penile piercing at this location, and states that he lost one of the little metal balls that screwed onto the piercing, states that he has now noticed a nodule in the area the size and shape of the ball that he lost and he is very concerned that it is possible that the metal ball is under the skin of his penis.  States the nodule is not tender      History of Present Illness  History reviewed. No pertinent past medical history.   History reviewed. No pertinent family history.   Past Surgical History:   Procedure Laterality Date    ADENOIDECTOMY      EAR TUBES      GASTRIC SLEEVE LAPAROSCOPIC      HERNIA REPAIR      TONSILLECTOMY          Current Outpatient Medications:     cyanocobalamin 1000 MCG/ML injection, Inject 1 mL into the appropriate muscle as directed by prescriber Every 30 (Thirty) Days. Injection weekly for 4 weeks then monthly thereafter, Disp: 3 mL, Rfl: 2    lisinopril (PRINIVIL,ZESTRIL) 20 MG tablet, Take 1 tablet by mouth Daily., Disp: 90 tablet, Rfl: 1    vitamin D (ERGOCALCIFEROL) 1.25 MG (67555 UT) capsule capsule, Take 1 capsule by mouth 1 (One) Time Per Week., Disp: 12 capsule, Rfl: 1    OBJECTIVE  Vital Signs:   /79   Pulse 68   Ht 182.9 cm (72\")   Wt (!) 140 kg (309 lb)   SpO2 97%   BMI 41.91 kg/mý    Estimated body mass index is 41.91 kg/mý as calculated from the following:    Height as of this encounter: 182.9 cm (72\").    Weight as of this encounter: 140 kg (309 lb).     Wt Readings from Last 3 Encounters:   08/23/23 (!) 140 kg (309 lb)   01/16/23 136 kg (300 " lb)   10/20/22 (!) 136 kg (300 lb 9.6 oz)     BP Readings from Last 3 Encounters:   08/23/23 132/79   01/16/23 111/58   10/20/22 132/84       Physical Exam  Vitals reviewed.   Constitutional:       Appearance: Normal appearance. He is well-developed.   HENT:      Head: Normocephalic and atraumatic.      Right Ear: External ear normal.      Left Ear: External ear normal.   Eyes:      Conjunctiva/sclera: Conjunctivae normal.      Pupils: Pupils are equal, round, and reactive to light.   Cardiovascular:      Rate and Rhythm: Normal rate and regular rhythm.      Heart sounds: No murmur heard.    No friction rub. No gallop.   Pulmonary:      Effort: Pulmonary effort is normal.      Breath sounds: Normal breath sounds. No wheezing or rhonchi.   Genitourinary:     Comments: Approximately 0.5 cm soft tissue nodule on ventral side of penis noted.  Nontender    Exam chaperoned by Gretta Hassan MA  Skin:     General: Skin is warm and dry.   Neurological:      Mental Status: He is alert and oriented to person, place, and time.      Cranial Nerves: No cranial nerve deficit.   Psychiatric:         Mood and Affect: Mood and affect normal.         Behavior: Behavior normal.         Thought Content: Thought content normal.         Judgment: Judgment normal.        Result Review    CMP          9/21/2022    06:53   CMP   Glucose 89    BUN 15    Creatinine 1.03    EGFR 88.5    Sodium 139    Potassium 4.1    Chloride 104    Calcium 9.2    Total Protein 6.9    Albumin 4.30    Globulin 2.6    Total Bilirubin 0.8    Alkaline Phosphatase 64    AST (SGOT) 17    ALT (SGPT) 10    Albumin/Globulin Ratio 1.7    BUN/Creatinine Ratio 14.6    Anion Gap 9.0      CBC          9/21/2022    06:53   CBC   WBC 6.53    RBC 5.06    Hemoglobin 14.6    Hematocrit 43.8    MCV 86.6    MCH 28.9    MCHC 33.3    RDW 13.1    Platelets 230      Lipid Panel          9/21/2022    06:53   Lipid Panel   Total Cholesterol 158    Triglycerides 69    HDL Cholesterol 37     VLDL Cholesterol 14    LDL Cholesterol  107    LDL/HDL Ratio 2.90      TSH          9/21/2022    06:53 1/4/2023    11:44   TSH   TSH 4.030  2.320          No Images in the past 120 days found..     The above data has been reviewed by RAHUL Palencia 08/23/2023 07:03 EDT.          Patient Care Team:  Katie Pang APRN as PCP - General (Nurse Practitioner)  Cherry Cortes MA as Medical Assistant            ASSESSMENT & PLAN    Diagnoses and all orders for this visit:    1. Hypertension, unspecified type (Primary)  Assessment & Plan:  BP fairly well controlled at present, cont current medication     Orders:  -     CBC w AUTO Differential; Future  -     Comprehensive metabolic panel; Future  -     lisinopril (PRINIVIL,ZESTRIL) 20 MG tablet; Take 1 tablet by mouth Daily.  Dispense: 90 tablet; Refill: 1    2. Vitamin D deficiency  -     vitamin D (ERGOCALCIFEROL) 1.25 MG (01244 UT) capsule capsule; Take 1 capsule by mouth 1 (One) Time Per Week.  Dispense: 12 capsule; Refill: 1  -     Vitamin D 25 hydroxy; Future    3. B12 deficiency  Assessment & Plan:  Well controlled with B-12 injections, cont current medication     Orders:  -     cyanocobalamin 1000 MCG/ML injection; Inject 1 mL into the appropriate muscle as directed by prescriber Every 30 (Thirty) Days. Injection weekly for 4 weeks then monthly thereafter  Dispense: 3 mL; Refill: 2  -     Vitamin B12; Future    4. Colon cancer screening  -     Cologuard - Stool, Per Rectum; Future    5. Lipid screening  -     Lipid panel; Future    6. Screening for thyroid disorder  -     TSH; Future    7. Nodule of shaft of penis  Assessment & Plan:  Discussed with patient that I feel like it looks most consistent with a possible epidermoid cyst, will obtain x-ray to rule out foreign body, ultrasound for further eval if needed    Orders:  -     XR Abdomen KUB; Future  -     US Soft Tissue; Future         Tobacco Use: Low Risk     Smoking Tobacco Use: Never     Smokeless Tobacco Use: Never    Passive Exposure: Not on file       Follow Up     Return in about 6 months (around 2/23/2024), or if symptoms worsen or fail to improve.        Patient was given instructions and counseling regarding his condition or for health maintenance advice. Please see specific information pulled into the AVS if appropriate.   I have reviewed information obtained and documented by others and I have confirmed the accuracy of this documented note.    RAHUL Palencia

## 2023-09-12 ENCOUNTER — HOSPITAL ENCOUNTER (OUTPATIENT)
Dept: ULTRASOUND IMAGING | Facility: HOSPITAL | Age: 53
Discharge: HOME | End: 2023-09-12
Admitting: NURSE PRACTITIONER
Payer: COMMERCIAL

## 2023-09-12 DIAGNOSIS — N48.89 NODULE OF SHAFT OF PENIS: ICD-10-CM

## 2023-09-12 PROCEDURE — 76999 ECHO EXAMINATION PROCEDURE: CPT

## 2023-12-22 ENCOUNTER — TELEPHONE (OUTPATIENT)
Dept: FAMILY MEDICINE CLINIC | Facility: CLINIC | Age: 53
End: 2023-12-22
Payer: COMMERCIAL

## 2024-02-26 ENCOUNTER — OFFICE VISIT (OUTPATIENT)
Dept: FAMILY MEDICINE CLINIC | Facility: CLINIC | Age: 54
End: 2024-02-26
Payer: COMMERCIAL

## 2024-02-26 VITALS
WEIGHT: 308 LBS | OXYGEN SATURATION: 97 % | DIASTOLIC BLOOD PRESSURE: 87 MMHG | HEART RATE: 63 BPM | HEIGHT: 72 IN | BODY MASS INDEX: 41.72 KG/M2 | SYSTOLIC BLOOD PRESSURE: 144 MMHG

## 2024-02-26 DIAGNOSIS — Z13.220 LIPID SCREENING: ICD-10-CM

## 2024-02-26 DIAGNOSIS — E55.9 VITAMIN D DEFICIENCY: ICD-10-CM

## 2024-02-26 DIAGNOSIS — Z13.29 THYROID DISORDER SCREEN: ICD-10-CM

## 2024-02-26 DIAGNOSIS — Z00.00 ANNUAL PHYSICAL EXAM: Primary | ICD-10-CM

## 2024-02-26 DIAGNOSIS — R06.2 WHEEZING: ICD-10-CM

## 2024-02-26 DIAGNOSIS — I10 HYPERTENSION, UNSPECIFIED TYPE: ICD-10-CM

## 2024-02-26 PROCEDURE — 99396 PREV VISIT EST AGE 40-64: CPT | Performed by: NURSE PRACTITIONER

## 2024-02-26 PROCEDURE — 99214 OFFICE O/P EST MOD 30 MIN: CPT | Performed by: NURSE PRACTITIONER

## 2024-02-26 RX ORDER — METHYLPREDNISOLONE 4 MG/1
TABLET ORAL
Qty: 1 EACH | Refills: 0 | Status: SHIPPED | OUTPATIENT
Start: 2024-02-26

## 2024-02-26 RX ORDER — ERGOCALCIFEROL 1.25 MG/1
50000 CAPSULE ORAL WEEKLY
Qty: 12 CAPSULE | Refills: 1 | Status: SHIPPED | OUTPATIENT
Start: 2024-02-26

## 2024-02-26 RX ORDER — LISINOPRIL 30 MG/1
30 TABLET ORAL DAILY
Qty: 90 TABLET | Refills: 1 | Status: SHIPPED | OUTPATIENT
Start: 2024-02-26

## 2024-02-26 NOTE — ASSESSMENT & PLAN NOTE
Blood pressure is not well-controlled at present, remains elevated upon manual recheck, after discussion we decided to increase lisinopril to 30 mg, we will follow-up in 1 month for reevaluation

## 2024-02-26 NOTE — PROGRESS NOTES
"Chief Complaint  Annual exam,Hypertension and Vitamin D Deficiency    SUBJECTIVE  Raúl López presents to [facility] for annual exam and 6 month follow up on HTN and Vit D deficiency    Reminded pt to return Cologard Kit.      Patient states he would like to cancel the MRI of the pelvis, declines any further evaluation at present    History of Present Illness  Past Medical History:   Diagnosis Date    Hypothyroidism     Obesity       History reviewed. No pertinent family history.   Past Surgical History:   Procedure Laterality Date    ADENOIDECTOMY      BARIATRIC SURGERY  2012    EAR TUBES      GASTRIC SLEEVE LAPAROSCOPIC      HERNIA REPAIR      TONSILLECTOMY          Current Outpatient Medications:     cyanocobalamin 1000 MCG/ML injection, Inject 1 mL into the appropriate muscle as directed by prescriber Every 30 (Thirty) Days. Injection weekly for 4 weeks then monthly thereafter, Disp: 3 mL, Rfl: 2    lisinopril (PRINIVIL,ZESTRIL) 30 MG tablet, Take 1 tablet by mouth Daily., Disp: 90 tablet, Rfl: 1    vitamin D (ERGOCALCIFEROL) 1.25 MG (61971 UT) capsule capsule, Take 1 capsule by mouth 1 (One) Time Per Week., Disp: 12 capsule, Rfl: 1    methylPREDNISolone (MEDROL) 4 MG dose pack, Take as directed on package instructions., Disp: 1 each, Rfl: 0    OBJECTIVE  Vital Signs:   /87   Pulse 63   Ht 182.9 cm (72\")   Wt (!) 140 kg (308 lb)   SpO2 97%   BMI 41.77 kg/m²    Estimated body mass index is 41.77 kg/m² as calculated from the following:    Height as of this encounter: 182.9 cm (72\").    Weight as of this encounter: 140 kg (308 lb).     Wt Readings from Last 3 Encounters:   02/26/24 (!) 140 kg (308 lb)   08/23/23 (!) 140 kg (309 lb)   01/16/23 136 kg (300 lb)     BP Readings from Last 3 Encounters:   02/26/24 144/87   08/23/23 132/79   01/16/23 111/58       Physical Exam  Vitals reviewed.   Constitutional:       General: He is not in acute distress.     Appearance: Normal " appearance. He is well-developed. He is obese. He is not diaphoretic.   HENT:      Head: Normocephalic and atraumatic. Hair is normal.      Right Ear: Hearing, tympanic membrane, ear canal and external ear normal.      Left Ear: Hearing, tympanic membrane, ear canal and external ear normal.      Nose: Nose normal. No nasal deformity.      Mouth/Throat:      Mouth: Mucous membranes are moist. No oral lesions.      Pharynx: Uvula midline. No uvula swelling.   Eyes:      General: Lids are normal. No scleral icterus.        Right eye: No discharge.         Left eye: No discharge.      Extraocular Movements: Extraocular movements intact.      Right eye: Normal extraocular motion and no nystagmus.      Left eye: Normal extraocular motion and no nystagmus.      Conjunctiva/sclera: Conjunctivae normal.      Pupils: Pupils are equal, round, and reactive to light.   Neck:      Thyroid: No thyromegaly.      Vascular: No JVD.   Cardiovascular:      Rate and Rhythm: Normal rate and regular rhythm.      Pulses: Normal pulses.      Heart sounds: Normal heart sounds. No murmur heard.     No friction rub. No gallop.   Pulmonary:      Effort: Pulmonary effort is normal. No respiratory distress.      Breath sounds: Wheezing present. No rhonchi or rales.   Chest:      Chest wall: No tenderness.   Abdominal:      General: Bowel sounds are normal. There is no distension.      Palpations: Abdomen is soft. There is no mass.      Tenderness: There is no abdominal tenderness. There is no guarding.      Hernia: No hernia is present.   Musculoskeletal:         General: No tenderness or deformity. Normal range of motion.      Cervical back: Normal range of motion and neck supple.   Lymphadenopathy:      Cervical: No cervical adenopathy.   Skin:     General: Skin is warm and dry.      Findings: No rash.   Neurological:      Mental Status: He is alert and oriented to person, place, and time.      Cranial Nerves: No cranial nerve deficit.       Coordination: Coordination normal.      Deep Tendon Reflexes: Reflexes are normal and symmetric. Reflexes normal.   Psychiatric:         Mood and Affect: Mood and affect normal.         Behavior: Behavior normal.         Thought Content: Thought content normal.         Judgment: Judgment normal.          Result Review    CMP          8/23/2023    08:03   CMP   Glucose 99    BUN 17    Creatinine 1.01    EGFR 90.0    Sodium 141    Potassium 4.3    Chloride 104    Calcium 9.2    Total Protein 7.2    Albumin 4.3    Globulin 2.9    Total Bilirubin 0.8    Alkaline Phosphatase 73    AST (SGOT) 15    ALT (SGPT) 17    Albumin/Globulin Ratio 1.5    BUN/Creatinine Ratio 16.8    Anion Gap 11.0      CBC          8/23/2023    08:03   CBC   WBC 6.51    RBC 5.34    Hemoglobin 15.4    Hematocrit 45.5    MCV 85.2    MCH 28.8    MCHC 33.8    RDW 13.0    Platelets 229      Lipid Panel          8/23/2023    08:03   Lipid Panel   Total Cholesterol 176    Triglycerides 79    HDL Cholesterol 40    VLDL Cholesterol 15    LDL Cholesterol  121    LDL/HDL Ratio 3.01      TSH          8/23/2023    08:03   TSH   TSH 2.550        Lab Results   Component Value Date    CJPI70MJ 26.6 (L) 08/23/2023     Lab Results   Component Value Date    TESTOSTERONE 340.00 09/21/2022     Lab Results   Component Value Date    FREET4 0.90 (L) 01/04/2023     Lab Results   Component Value Date    KCLMMXSJ71 394 08/23/2023       No Images in the past 120 days found..     The above data has been reviewed by RAHUL Palencia 02/26/2024 07:09 EST.          Patient Care Team:  Katie Pang APRN as PCP - General (Nurse Practitioner)  Cherry Cortes MA as Medical Assistant       ASSESSMENT & PLAN    Diagnoses and all orders for this visit:    1. Annual physical exam (Primary)  -     Comprehensive Metabolic Panel; Future  -     CBC & Differential; Future  -     Lipid Panel; Future  -     TSH Rfx On Abnormal To Free T4; Future  -     Vitamin D 25 hydroxy;  Future    2. Lipid screening  -     Lipid Panel; Future    3. Thyroid disorder screen  -     TSH Rfx On Abnormal To Free T4; Future    4. Vitamin D deficiency  Overview:  Stable, continue vitamin D    Orders:  -     Vitamin D 25 hydroxy; Future  -     vitamin D (ERGOCALCIFEROL) 1.25 MG (92131 UT) capsule capsule; Take 1 capsule by mouth 1 (One) Time Per Week.  Dispense: 12 capsule; Refill: 1    5. Hypertension, unspecified type  Assessment & Plan:  Blood pressure is not well-controlled at present, remains elevated upon manual recheck, after discussion we decided to increase lisinopril to 30 mg, we will follow-up in 1 month for reevaluation    Orders:  -     lisinopril (PRINIVIL,ZESTRIL) 30 MG tablet; Take 1 tablet by mouth Daily.  Dispense: 90 tablet; Refill: 1    6. Wheezing  Comments:  Patient states had URI recently, patient reports improving each day, declines imaging at present, will follow-up if symptoms do not completely resolve  Orders:  -     methylPREDNISolone (MEDROL) 4 MG dose pack; Take as directed on package instructions.  Dispense: 1 each; Refill: 0         Tobacco Use: Low Risk  (2/26/2024)    Patient History     Smoking Tobacco Use: Never     Smokeless Tobacco Use: Never     Passive Exposure: Not on file       Follow Up     Return in about 6 months (around 8/26/2024), or if symptoms worsen or fail to improve.        Patient was given instructions and counseling regarding his condition or for health maintenance advice. Please see specific information pulled into the AVS if appropriate.   I have reviewed information obtained and documented by others and I have confirmed the accuracy of this documented note.    RAHUL Palencia

## 2024-05-30 ENCOUNTER — TELEPHONE (OUTPATIENT)
Dept: FAMILY MEDICINE CLINIC | Facility: CLINIC | Age: 54
End: 2024-05-30
Payer: COMMERCIAL

## 2024-08-21 ENCOUNTER — TELEPHONE (OUTPATIENT)
Dept: FAMILY MEDICINE CLINIC | Facility: CLINIC | Age: 54
End: 2024-08-21
Payer: COMMERCIAL